# Patient Record
Sex: FEMALE | Race: BLACK OR AFRICAN AMERICAN
[De-identification: names, ages, dates, MRNs, and addresses within clinical notes are randomized per-mention and may not be internally consistent; named-entity substitution may affect disease eponyms.]

---

## 2017-04-28 NOTE — DIAGNOSTIC IMAGING REPORT
PROCEDURE: MRI right lower extremity without contrast.



TECHNIQUE: Multiplanar, multisequence non contrast-enhanced MRI

of the right ankle was accomplished.



INDICATION: Foot pain. Patient is a track runner. Evaluate for

stress injury.



COMPARISONS: None available.



FINDINGS:



TENDONS:

Distal Achilles tendon is normal. The peroneal tendons are

normal in position without tenosynovitis and are intact.

Posterior tibialis, flexor digitorum longus and flexor hallux

longus tendons are normal. The anterior tibialis and extensor

hallux longus are normal. There is mild tenosynovitis of the

extensor digitorum longus at the level of the talar neck.



LIGAMENTS:

Inferior tibiofibular syndesmotic ligaments are intact. The

anterior talofibular, calcaneofibular and posterior talofibular

ligaments are normal. Medial collateral ligamentous complex is

normal.



BONES AND CARTILAGE:

No osteochondral lesion of the talar dome. No fracture or stress

fracture. The articular cartilage of the tibiotalar and

posterior subtalar joints are normal.



SOFT TISSUES:

No evidence of plantar fasciitis. No abnormal soft tissue

scar/fibrosis within the tarsal canal/sinus tarsi or tarsal

tunnel. No ankle joint effusion.



IMPRESSION:

1. No stress fracture or bone contusion.

2. No acute ligamentous injury.

3. Mild tenosynovitis of the extensor digitorum longus common

sheath at the level of the talar neck. This is near but does not

directly correspond to the marker placed on the lateral aspect

of the mid/hindfoot marked area of maximal pain. Etiology is

likely mechanical in nature due to chronic repetitive

microtrauma from running.



Dictated by:



Dictated on workstation # [id number]

## 2018-01-29 ENCOUNTER — HOSPITAL ENCOUNTER (EMERGENCY)
Dept: HOSPITAL 75 - ER | Age: 23
Discharge: HOME | End: 2018-01-29
Payer: COMMERCIAL

## 2018-01-29 VITALS — SYSTOLIC BLOOD PRESSURE: 124 MMHG | DIASTOLIC BLOOD PRESSURE: 82 MMHG

## 2018-01-29 VITALS — HEIGHT: 69 IN | WEIGHT: 155 LBS | BODY MASS INDEX: 22.96 KG/M2

## 2018-01-29 DIAGNOSIS — N39.0: ICD-10-CM

## 2018-01-29 DIAGNOSIS — M54.5: Primary | ICD-10-CM

## 2018-01-29 LAB
APTT PPP: YELLOW S
BACTERIA #/AREA URNS HPF: (no result) /HPF
BARBITURATES UR QL: NEGATIVE
BENZODIAZ UR QL SCN: NEGATIVE
BILIRUB UR QL STRIP: NEGATIVE
COCAINE UR QL: NEGATIVE
FIBRINOGEN PPP-MCNC: (no result) MG/DL
GLUCOSE UR STRIP-MCNC: NEGATIVE MG/DL
KETONES UR QL STRIP: NEGATIVE
LEUKOCYTE ESTERASE UR QL STRIP: (no result)
METHADONE UR QL SCN: NEGATIVE
METHAMPHETAMINE SCREEN URINE S: NEGATIVE
NITRITE UR QL STRIP: NEGATIVE
OPIATES UR QL SCN: NEGATIVE
OXYCODONE UR QL: NEGATIVE
PH UR STRIP: 6 [PH] (ref 5–9)
PROPOXYPH UR QL: NEGATIVE
PROT UR QL STRIP: (no result)
RBC #/AREA URNS HPF: (no result) /HPF
SP GR UR STRIP: 1.02 (ref 1.02–1.02)
SQUAMOUS #/AREA URNS HPF: (no result) /HPF
TRICYCLICS UR QL SCN: NEGATIVE
UROBILINOGEN UR-MCNC: NORMAL MG/DL

## 2018-01-29 PROCEDURE — 80306 DRUG TEST PRSMV INSTRMNT: CPT

## 2018-01-29 PROCEDURE — 99283 EMERGENCY DEPT VISIT LOW MDM: CPT

## 2018-01-29 PROCEDURE — 84703 CHORIONIC GONADOTROPIN ASSAY: CPT

## 2018-01-29 PROCEDURE — 87088 URINE BACTERIA CULTURE: CPT

## 2018-01-29 PROCEDURE — 81000 URINALYSIS NONAUTO W/SCOPE: CPT

## 2018-01-29 NOTE — ED BACK PAIN
General


Chief Complaint:  Back Problems


Stated Complaint:  BACK PAIN


Nursing Triage Note:  


PATIENT STATES THAT SHE HURT HER BACK IN TRACK LAST MAY AND WAS SEEING A 


CHIROPRACTOR. IT GOT BETTER UNTIL LAST SATURDAY NIGHT WHEN SHE WAS DANCING AND 


IT STARTED INSTANTLY HURTNG AGAIN. IT IS MOSTLY ON THE LEFT SIDE OF HER LOWER 


BACK.


Nursing Sepsis Screen:  No Definite Risk


Source of Information:  Patient


Exam Limitations:  No Limitations





History of Present Illness


Date Seen by Provider:  Jan 29, 2018


Time Seen by Provider:  20:02


Initial Comments


To ER with a complaint of left low back pain that began on Saturday night 

abruptly after a dance move. Pain is nonradiating. Currently she is being 

treated for bacterial vaginosis and yeast infection with Diflucan and 

metronidazole prescribed by [employer] [facility]. She attends [employer] and is running 

track. She has a history of some chronic back pain and history of sacroiliac 

joint fracture from track related injury she states that she has not taken 

anything at home for the pain.


Location:  Lumbar Spine


Timing/Duration:  2-3 Days


Severity:  Moderate


Associated Symptoms:  lower back pain





Allergies and Home Medications


Allergies


Coded Allergies:  


     No Known Drug Allergies (Unverified , 1/29/18)





Constitutional:  see HPI


EENTM:  see HPI


Respiratory:  no symptoms reported


Cardiovascular:  no symptoms reported


Genitourinary:  no symptoms reported


Musculoskeletal:  see HPI, back pain


Skin:  no symptoms reported





Past Medical-Social-Family Hx


Patient Social History


Alcohol Use:  Rarely Uses


Recreational Drug Use:  No


Smoking Status:  Never a Smoker


2nd Hand Smoke Exposure:  No


Recent Foreign Travel:  No


Contact w/Someone Who Travel:  No


Recent Infectious Disease Expo:  No


Recent Hopitalizations:  No





Seasonal Allergies


Seasonal Allergies:  No





Surgeries


History of Surgeries:  No





Respiratory


History of Respiratory Disorde:  No





Cardiovascular


History of Cardiac Disorders:  No





Neurological


History of Neurological Disord:  No





Genitourinary


History of Genitourinary Disor:  No





Gastrointestinal


History of Gastrointestinal Di:  No





Musculoskeletal


History of Musculoskeletal Dis:  No





Endocrine


History of Endocrine Disorders:  No





HEENT


History of HEENT Disorders:  No





Cancer


History of Cancer:  No





Psychosocial


History of Psychiatric Problem:  No





Integumentary


History of Skin or Integumenta:  No





Blood Transfusions


History of Blood Disorders:  No





Physical Exam


Vital Signs





Vital Sign - Last 12Hours








 1/29/18





 19:33


 


Temp 98.0


 


Pulse 72


 


Resp 20


 


B/P (MAP) 124/82 (96)


 


Pulse Ox 97


 


O2 Delivery Room Air





Capillary Refill : Less Than 3 Seconds


General Appearance:  No Apparent Distress, WD/WN


HEENT:  PERRL/EOMI, TMs Normal


Neck:  Full Range of Motion, Normal Inspection, Non Tender


Respiratory:  No Accessory Muscle Use, Accessory Muscle Use


Gastrointestinal:  Non Tender, Soft


Back:  Other (area of tenderness area of tenderness over the left sacroiliac 

joint)


Neurologic/Psychiatric:  Alert, Oriented x3, No Motor/Sensory Deficits


Skin:  Normal Color, Warm/Dry





Progress/Results/Core Measures


Results/Orders


Lab Results





Laboratory Tests








Test


  1/29/18


19:25 Range/Units


 


 


Urine Color YELLOW   


 


Urine Clarity VERY CLOUDY H  


 


Urine pH 6  5-9  


 


Urine Specific Gravity 1.020  1.016-1.022  


 


Urine Protein 1+ H NEGATIVE  


 


Urine Glucose (UA) NEGATIVE  NEGATIVE  


 


Urine Ketones NEGATIVE  NEGATIVE  


 


Urine Nitrite NEGATIVE  NEGATIVE  


 


Urine Bilirubin NEGATIVE  NEGATIVE  


 


Urine Urobilinogen NORMAL  NORMAL  MG/DL


 


Urine Leukocyte Esterase 2+ H NEGATIVE  


 


Urine RBC (Auto) NEGATIVE  NEGATIVE  


 


Urine RBC NONE   /HPF


 


Urine WBC 10-25 H  /HPF


 


Urine Squamous Epithelial


Cells 25-50 H


   /HPF


 


 


Urine Crystals NONE   /LPF


 


Urine Bacteria LARGE H  /HPF


 


Urine Casts NONE   /LPF


 


Urine Mucus NEGATIVE   /LPF


 


Urine Culture Indicated YES   


 


Urine Opiates Screen NEGATIVE  NEGATIVE  


 


Urine Oxycodone Screen NEGATIVE  NEGATIVE  


 


Urine Methadone Screen NEGATIVE  NEGATIVE  


 


Urine Propoxyphene Screen NEGATIVE  NEGATIVE  


 


Urine Barbiturates Screen NEGATIVE  NEGATIVE  


 


Ur Tricyclic Antidepressants


Screen NEGATIVE 


  NEGATIVE  


 


 


Urine Phencyclidine Screen NEGATIVE  NEGATIVE  


 


Urine Amphetamines Screen NEGATIVE  NEGATIVE  


 


Urine Methamphetamines Screen NEGATIVE  NEGATIVE  


 


Urine Benzodiazepines Screen NEGATIVE  NEGATIVE  


 


Urine Cocaine Screen NEGATIVE  NEGATIVE  


 


Urine Cannabinoids Screen NEGATIVE  NEGATIVE  








My Orders





Orders - VIKAS DUKE


Ua Culture If Indicated (1/29/18 19:20)


Urine Pregnancy Bedside (1/29/18 19:20)


Drug Screen Stat (Urine) (1/29/18 19:39)


Urine Culture (1/29/18 19:25)





Vital Signs/I&O





Vital Sign - Last 12Hours








 1/29/18





 19:33


 


Temp 98.0


 


Pulse 72


 


Resp 20


 


B/P (MAP) 124/82 (96)


 


Pulse Ox 97


 


O2 Delivery Room Air














Blood Pressure Mean:  96











Point of Care Testing


Urine Pregnancy-Bedside:  Negative





Departure


Impression


Impression:  


 Primary Impression:  


 Acute low back pain without sciatica


 Additional Impression:  


 Urinary tract infection


Disposition:  01 HOME, SELF-CARE


Condition:  Stable





Departure-Patient Inst.


Decision time for Depature:  20:04


Referrals:  


NO,LOCAL PHYSICIAN (PCP/Family)


Primary Care Physician


Patient Instructions:  Low Back Pain  (DC), Urinary Tract Infection, Adult (DC)





Add. Discharge Instructions:  


1. Take the new antibiotics in addition to your current antibiotics. Follow-up 

with [employer] student [facility] to discuss an MRI of her lumbar spine giving the length 

of your low back pains. Medication as directed. All discharge instructions 

reviewed with patient and/or family. Voiced understanding.


Scripts


Sulfamethoxazole/Trimethoprim (Bactrim Ds Tablet) 1 Each Tablet


1 EACH PO BID, #10 TAB


   Prov: VIKAS DUKE APRN         1/29/18 


Naproxen (Naprosyn) 500 Mg Tablet


500 MG PO BID Y for PAIN-MODERATE, #14 TAB


   Prov: VIKAS DUKE APRN         1/29/18 


Cyclobenzaprine HCl (Cyclobenzaprine HCl) 5 Mg Tablet


5 MG PO TID, #20 TAB


   Prov: VIKAS DUKE APRN         1/29/18











VIKAS DUKE Jan 29, 2018 20:06

## 2018-01-29 NOTE — XMS REPORT
[facility]

 Created on: 2016



Fior Bonilla

External Reference #: [id number]

: 1995

Sex: Female



Demographics







 Address  [address] [place] [address]  [address]

 

 Home Phone  [phone]

 

 Preferred Language  Unknown

 

 Marital Status  Unknown

 

 Yarsanism Affiliation  Unknown

 

 Race  Black

 

 Ethnic Group  Not  or 





Author







 Author  CLARK SPRAGUE

 

 Organization  [employer]

 

 Address  Unknown

 

 Phone  Unavailable







Care Team Providers







 Care Team Member Name  Role  Phone

 

 CLARK SPRAGUE  CP  Unavailable



                                                                



Allergies, Adverse Reactions, Alerts

          





 Substance  Reaction  Event Type

 

 N.K.D.A.  Info Not Available  Non Drug Allergy



                                                                               
         



Problems

          





 Problem Type  Condition  Code  Onset Dates  Condition Status

 

 Assessment  Bronchitis  J40     Active



                                                                               
         



Medications

          





 Medication  Code System  Code  Instructions  Start Date  End Date  Status  
Dosage

 

 Zithromax Z-Edward  NDC  [address]-03  250 MG Orally Once a day       2 tablets  on the first day, then 1 tablet daily for 4 days

 

 ProAir HFA  NDC  [address]-34  108 (90 Base) MCG/ACT Inhalation every 4 hrs 
prn  2016        2 puffs as needed



                                                                               
                   



Procedures

          





 Procedure  Coding System  Code  Date

 

 Office Visit, New Pt., Level 3  CPT-4  48611  2016



                                                                               
                   



Vital Signs

          





 Date/Time:  2016

 

 Temperature  99.8 F

 

 BMIPercentile  50.6 %

 

 Weight  151.4 lbs

 

 Height  70 in

 

 BMI  21.72 Index

 

 Blood Pressure Diastolic  62 mmHg

 

 Blood Pressure Systolic  120 mmHg

 

 Cardiac Monitoring Heart Rate  72 bpm

 

 Wt Percentile  81.51 %

 

 Ht Percentile  98.77 %



                                                                              



Results

          No Known Results                                                     
               



Summary Purpose

          [employer] Submission

## 2018-01-29 NOTE — XMS REPORT
Continuity of Care Document

 Created on: 2018



ANALY MARY

External Reference #: [id number]

: 1995

Sex: Female



Demographics







 Address  [address]  [address]

 

 Home Phone  [phone] x

 

 Preferred Language  Unknown

 

 Marital Status  Unknown

 

 Gnosticism Affiliation  Unknown

 

 Race  Unknown

 

 Ethnic Group  Unknown





Author







 Author  [facility]

 

 Organization  [facility]

 

 Address  Unknown

 

 Phone  Unavailable



              



Allergies

      



There is no data.                  



Medications

      



There is no data.                  



Problems

      





 Date Dx Coded            Attending            Type            Code            
Diagnosis            Diagnosed By        

 

 2016            ALEXIS TOBIAS DC, Ot            M54.5       
                           

 

 2016                         Ot            N63                          
        

 

 2017            ALEXIS TOBIAS DC, Ot            M54.5       
     LOW BACK PAIN                     

 

 2017                         Ot            N63            UNSPECIFIED 
LUMP IN BREAST                     

 

 2017            JUANITO HOLT MD            Ot            M65.9         
   SYNOVITIS AND TENOSYNOVITIS, UNSPECIFIED                     

 

 2017            JUANITO HOLT MD            Ot            M79.671       
     PAIN IN RIGHT FOOT                     

 

 2017            JUANITO HOLT MD            Ot            M65.9         
   SYNOVITIS AND TENOSYNOVITIS, UNSPECIFIED                     

 

 2017            JUANITO HOLT MD, Ot            M79.671       
     PAIN IN RIGHT FOOT                     



                                



Procedures

      



There is no data.                  



Results

      



There is no data.              



Encounters

      





 ACCT No.            Visit Date/Time            Discharge            Status    
        Pt. Type            Provider            Facility            Loc./Unit  
          Complaint        

 

 [id number]            2017 09:39:00            2017 23:59:59    
        [facility]            Outpatient            JUANITO HOLT MD            [facility]            RAD            M79.671 RT FOOT PAIN    
    

 

 [id number]            2016 12:09:00            2016 23:59:59    
        [facility]            Outpatient            ALEXIS TOBIAS DC            [facility]            RAD            LOW BACK PAIN OVER 1 YR 
       

 

 [id number]            2016 09:10:00                                   
   Document Registration

## 2018-02-27 ENCOUNTER — HOSPITAL ENCOUNTER (OUTPATIENT)
Dept: HOSPITAL 75 - REHAB | Age: 23
LOS: 10 days | Discharge: HOME | End: 2018-03-09
Attending: NURSE PRACTITIONER
Payer: COMMERCIAL

## 2018-02-27 DIAGNOSIS — M54.5: Primary | ICD-10-CM
